# Patient Record
Sex: MALE | Race: NATIVE HAWAIIAN OR OTHER PACIFIC ISLANDER | URBAN - METROPOLITAN AREA
[De-identification: names, ages, dates, MRNs, and addresses within clinical notes are randomized per-mention and may not be internally consistent; named-entity substitution may affect disease eponyms.]

---

## 2017-10-02 ENCOUNTER — THERAPY VISIT (OUTPATIENT)
Dept: PHYSICAL THERAPY | Facility: CLINIC | Age: 77
End: 2017-10-02
Payer: COMMERCIAL

## 2017-10-02 DIAGNOSIS — M79.605 PAIN OF LEFT LOWER EXTREMITY: Primary | ICD-10-CM

## 2017-10-02 PROCEDURE — 97110 THERAPEUTIC EXERCISES: CPT | Mod: GP | Performed by: PHYSICAL THERAPIST

## 2017-10-02 PROCEDURE — 97161 PT EVAL LOW COMPLEX 20 MIN: CPT | Mod: GP | Performed by: PHYSICAL THERAPIST

## 2017-10-02 NOTE — PROGRESS NOTES
[facility] Initial Evaluation      Subjective:    Patient is a 77 year old male presenting with rehab left knee hpi. The history is provided by the patient. The history is limited by a language barrier.   Affected Side: L thigh/ leg and R leg/ ankle.  Condition occurred with:  Insidious onset.  Condition occurred: for unknown reasons.  This is a chronic condition  MD referral 9/25/2017.  Reports R knee surgery 7/11/2017.  History of L knee surgery also..      Radiates to:  Thigh, knee, lower leg and hip (L thigh/ ITB;  also has pain in R foot).  Pain is described as aching and sharp and is constant and reported as 7/10.  Associated symptoms:  Loss of strength and loss of motion/stiffness.   Symptoms are exacerbated by walking, standing, bending/squatting and sitting and relieved by nothing.  Since onset symptoms are unchanged.        General health as reported by patient is good.    Medical allergies: no.  Other surgeries include:  Orthopedic surgery (knees).  Current medications:  None as reported by the patient.  Current occupation is none.        Barriers include:  None as reported by the patient.    Red flags:  None as reported by the patient.                        Objective:      Gait:  Uses cane on R.  Short stride on L.    Gait Type:  Antalgic                                                           Knee Evaluation:  ROM:  AROM: normal            Strength:     Extension:  Left: 5/5   Pain:      Right: 5-/5   Pain:  Flexion:  Left: 5/5   Pain:      Right: 5-/5   Pain:    Quad Set Left: Good    Pain:   Quad Set Right: Good    Pain:  Ligament Testing:  Normal                Special Tests:   Left knee positive for the following special tests:  IT Band Friction    Palpation:    Left knee tenderness present at:  IT Band and Gluteus Medius    Edema:  Edema of the knee: mild swelling R ankle.            General     ROS    Assessment/Plan:      Patient is a 77 year old male with bilat leg  complaints.    Patient has the following significant findings with corresponding treatment plan.                Diagnosis 1:  bilat leg pains  Pain -  manual therapy, self management, education, directional preference exercise and home program  Decreased ROM/flexibility - manual therapy and therapeutic exercise  Decreased strength - therapeutic exercise and therapeutic activities  Decreased function - therapeutic activities    Therapy Evaluation Codes:   1) History comprised of:   Personal factors that impact the plan of care:      Language.    Comorbidity factors that impact the plan of care are:      None.     Medications impacting care: None.  2) Examination of Body Systems comprised of:   Body structures and functions that impact the plan of care:      bilat leg pains.   Activity limitations that impact the plan of care are:      Standing and Walking.  3) Clinical presentation characteristics are:   Stable/Uncomplicated.  4) Decision-Making    Low complexity using standardized patient assessment instrument and/or measureable assessment of functional outcome.  Cumulative Therapy Evaluation is: Low complexity.    Previous and current functional limitations:  (See Goal Flow Sheet for this information)    Short term and Long term goals: (See Goal Flow Sheet for this information)     Communication ability:  Slight language barier.  Treatment Explanation - The following has been discussed with the patient:   RX ordered/plan of care  Anticipated outcomes  Possible risks and side effects  This patient would benefit from PT intervention to resume normal activities.   Rehab potential is good.    Frequency:  1 X week, once daily  Duration:  for 6 weeks  Discharge Plan:  Achieve all LTG.  Independent in home treatment program.  Reach maximal therapeutic benefit.    Please refer to the daily flowsheet for treatment today, total treatment time and time spent performing 1:1 timed codes.

## 2017-10-02 NOTE — LETTER
[facility]  [address]  [facility] [address]  [phone]    2017  Re: Juan José Sauceda   :   1940  MRN:  [id number]   REFERRING PHYSICIAN:   Betina Hooks  [facility]  Date of Initial Evaluation: 10/2/2017  Visits: 1 Rxs Used: 1  Reason for Referral:  Pain of left lower extremity  EVALUATION SUMMARY  [facility] Initial Evaluation  Subjective:  Patient is a 77 year old male presenting with rehab left knee hpi. The history is provided by the patient. The history is limited by a language barrier.   Affected Side: L thigh/ leg and R leg/ ankle.  Condition occurred with:  Insidious onset.  Condition occurred: for unknown reasons.  This is a chronic condition  MD referral 2017.  Reports R knee surgery 2017.  History of L knee surgery also..      Radiates to:  Thigh, knee, lower leg and hip (L thigh/ ITB;  also has pain in R foot).  Pain is described as aching and sharp and is constant and reported as 7/10.  Associated symptoms:  Loss of strength and loss of motion/stiffness.   Symptoms are exacerbated by walking, standing, bending/squatting and sitting and relieved by nothing.  Since onset symptoms are unchanged.        General health as reported by patient is good.    Medical allergies: no.  Other surgeries include:  Orthopedic surgery (knees).  Current medications:  None as reported by the patient.  Current occupation is none.      Barriers include:  None as reported by the patient.  Red flags:  None as reported by the patient.  Objective:  Gait:  Uses cane on R.  Short stride on L.    Gait Type:  Antalgic     Knee Evaluation:  ROM:  AROM: normal    Strength:   Extension:  Left: 5/5   Pain:      Right: 5-/5   Pain:  Flexion:  Left: 5/5   Pain:      Right: 5-/5   Pain:    Quad Set Left: Good    Pain:   Quad Set Right: Good    Pain:  Ligament Testing:  Normal  Special  Tests:   Left knee positive for the following special tests:  IT Band Friction  Palpation:    Left knee tenderness present at:  IT Band and Gluteus Medius  Edema:  Edema of the knee: mild swelling R ankle.  General   ROS  Assessment/Plan:    Patient is a 77 year old male with bilat leg complaints.    Patient has the following significant findings with corresponding treatment plan.                Diagnosis 1:  bilat leg pains  Pain -  manual therapy, self management, education, directional preference exercise and home program  Re: Juan José Sauceda   :   1940  Decreased ROM/flexibility - manual therapy and therapeutic exercise  Decreased strength - therapeutic exercise and therapeutic activities  Decreased function - therapeutic activities  Therapy Evaluation Codes:   1) History comprised of:   Personal factors that impact the plan of care:      Language.    Comorbidity factors that impact the plan of care are:      None.     Medications impacting care: None.  2) Examination of Body Systems comprised of:   Body structures and functions that impact the plan of care:      bilat leg pains.   Activity limitations that impact the plan of care are:      Standing and Walking.  3) Clinical presentation characteristics are:   Stable/Uncomplicated.  4) Decision-Making    Low complexity using standardized patient assessment instrument and/or measureable assessment of functional outcome.  Cumulative Therapy Evaluation is: Low complexity.  Previous and current functional limitations:  (See Goal Flow Sheet for this information)    Short term and Long term goals: (See Goal Flow Sheet for this information)   Communication ability:  Slight language barier.  Treatment Explanation - The following has been discussed with the patient:   RX ordered/plan of care  Anticipated outcomes  Possible risks and side effects  This patient would benefit from PT intervention to resume normal activities.   Rehab potential is good.  Frequency:  1  X week, once daily  Duration:  for 6 weeks  Discharge Plan:  Achieve all LTG.  Independent in home treatment program.  Reach maximal therapeutic benefit.  Thank you for your referral.    INQUIRIES  Therapist: Alphonso Davies PT   [facility]  [address] [place] [id number]  Phone: [phone]  Fax: [phone]

## 2017-10-02 NOTE — MR AVS SNAPSHOT
"              After Visit Summary   10/2/2017    Juan José Sauceda    MRN: [id number]           Patient Information     Date Of Birth          1940        Visit Information        Provider Department      10/2/2017 11:30 AM Alphonso Davies, PT [facility]        Today's Diagnoses     Pain of left lower extremity    -  1       Follow-ups after your visit        Your next 10 appointments already scheduled     Oct 11, 2017 11:00 AM CDT   LUIZ Extremity with Alphonso Davies PT   [facility] ([facility])    [address]  [facility] [address]   [phone]              Who to contact     If you have questions or need follow up information about today's clinic visit or your schedule please contact [facility]LET[facility] PT directly at [phone].  Normal or non-critical lab and imaging results will be communicated to you by MyChart, letter or phone within 4 business days after the clinic has received the results. If you do not hear from us within 7 days, please contact the clinic through MyChart or phone. If you have a critical or abnormal lab result, we will notify you by phone as soon as possible.  Submit refill requests through [employer] or call your pharmacy and they will forward the refill request to us. Please allow 3 business days for your refill to be completed.          Additional Information About Your Visit        MyChart Information     [employer] lets you send messages to your doctor, view your test results, renew your prescriptions, schedule appointments and more. To sign up, go to [email or web address] . Click on \"Log in\" on the left side of the screen, which will take you to the Welcome page. Then click on \"Sign up Now\" on the right side of the page.     You will be asked to enter the access code listed below, as well as some personal information. Please follow the " directions to create your username and password.     Your access code is: [email or web address]  Expires: 2017  2:11 PM     Your access code will  in 90 days. If you need help or a new code, please call your [place] clinic or [phone].        Care EveryWhere ID     This is your Care EveryWhere ID. This could be used by other organizations to access your [place] medical records  [id number]         Blood Pressure from Last 3 Encounters:   No data found for BP    Weight from Last 3 Encounters:   No data found for Wt              We Performed the Following     HC PT EVAL, LOW COMPLEXITY     LUIZ INITIAL EVAL REPORT     THERAPEUTIC EXERCISES        Primary Care Provider    None Specified       No primary provider on file.        Equal Access to Services     [facility] : Quincy Aggarwal, cathryn fatima, sydni perry, milagros jaimes . So [facility] [phone].    ATENCIÓN: Si habla español, tiene a rojas disposición servicios gratuitos de asistencia lingüística. Llame al [phone].    We comply with applicable federal civil rights laws and [place] laws. We do not discriminate on the basis of race, color, national origin, age, disability, sex, sexual orientation, or gender identity.            Thank you!     Thank you for choosing [facility]  for your care. Our goal is always to provide you with excellent care. Hearing back from our patients is one way we can continue to improve our services. Please take a few minutes to complete the written survey that you may receive in the mail after your visit with us. Thank you!             Your Updated Medication List - Protect others around you: Learn how to safely use, store and throw away your medicines at www.disposemymeds.org.          This list is accurate as of: 10/2/17  2:11 PM.  Always use your most recent med list.                   Brand Name Dispense Instructions for use  Diagnosis    ADVAIR DISKUS 500-50 MCG/DOSE diskus inhaler   Generic drug:  fluticasone-salmeterol      Inhale 1 puff into the lungs every 12 hours.        albuterol 90 MCG/ACT inhaler      Inhale 2 puffs into the lungs every 6 hours as needed.        amiodarone 200 MG tablet    PACERONE/CODARONE     Take 200 mg by mouth daily.        ASMANEX 120 METERED DOSES 220 MCG/INH Inhaler   Generic drug:  mometasone      Inhale 2 puffs into the lungs daily.        ergocalciferol 35432 UNITS capsule    ERGOCALCIFEROL     Take 50,000 Units by mouth once a week.        ertapenem 1 GM injection    INVanz    85 mL    Inject 1 g into the vein every 24 hours.        GLUCOTROL 10 MG tablet   Generic drug:  glipiZIDE      Take 10 mg by mouth 2 times daily (before meals).        LANTUS VIAL 100 UNIT/ML injection   Generic drug:  insulin glargine      Inject  Subcutaneous daily. 5 units before lunch        LIPITOR 40 MG tablet   Generic drug:  atorvastatin      Take 40 mg by mouth daily.        NovoLIN MIX 70/30 VIAL injection   Generic drug:  insulin NPH-insulin regular      Inject  Subcutaneous See Admin Instructions. Sliding scale        predniSONE 1 MG tablet    DELTASONE     Take  by mouth daily. 2 tablets once daily with a meal        SPIRIVA HANDIHALER 18 MCG capsule   Generic drug:  tiotropium      Inhale 18 mcg into the lungs daily.        warfarin 5 MG tablet    COUMADIN     Take  by mouth daily. Sliding scale

## 2017-10-18 ENCOUNTER — THERAPY VISIT (OUTPATIENT)
Dept: PHYSICAL THERAPY | Facility: CLINIC | Age: 77
End: 2017-10-18
Payer: COMMERCIAL

## 2017-10-18 DIAGNOSIS — M79.605 PAIN OF LEFT LOWER EXTREMITY: ICD-10-CM

## 2017-10-18 PROCEDURE — 97110 THERAPEUTIC EXERCISES: CPT | Mod: GP | Performed by: PHYSICAL THERAPIST

## 2017-10-18 PROCEDURE — 97112 NEUROMUSCULAR REEDUCATION: CPT | Mod: GP | Performed by: PHYSICAL THERAPIST

## 2017-10-18 NOTE — MR AVS SNAPSHOT
"              After Visit Summary   10/18/2017    Juan José Sauceda    MRN: [id number]           Patient Information     Date Of Birth          1940        Visit Information        Provider Department      10/18/2017 11:20 AM Oxana Cho, PT [facility] PT        Today's Diagnoses     Pain of left lower extremity           Follow-ups after your visit        Your next 10 appointments already scheduled     Oct 30, 2017 11:30 AM CDT   LUIZ Extremity with Oxana Cho PT   [facility] ([facility])    [address] [facility] [address] [phone]            Nov 06, 2017 11:30 AM CST   LUIZ Extremity with Oxana Cho PT   [facility] ([facility])    [address] [facility] [id number] [phone]              Who to contact     If you have questions or need follow up information about today's clinic visit or your schedule please contact [facility] PT directly at [phone].  Normal or non-critical lab and imaging results will be communicated to you by [employer], letter or phone within 4 business days after the clinic has received the results. If you do not hear from us within 7 days, please contact the clinic through [employer] or phone. If you have a critical or abnormal lab result, we will notify you by phone as soon as possible.  Submit refill requests through [employer] or call your pharmacy and they will forward the refill request to us. Please allow 3 business days for your refill to be completed.          Additional Information About Your Visit        Tiq[employer]hart Information     [employer] lets you send messages to your doctor, view your test results, renew your prescriptions, schedule appointments and more. To sign up, go to [email or web address] . Click on \"Log in\" on the left side of the screen, which will take " "you to the Welcome page. Then click on \"Sign up Now\" on the right side of the page.     You will be asked to enter the access code listed below, as well as some personal information. Please follow the directions to create your username and password.     Your access code is: [email or web address]  Expires: 2017  2:11 PM     Your access code will  in 90 days. If you need help or a new code, please call your [place] clinic or [phone].        Care EveryWhere ID     This is your Care EveryWhere ID. This could be used by other organizations to access your [place] medical records  [id number]         Blood Pressure from Last 3 Encounters:   No data found for BP    Weight from Last 3 Encounters:   No data found for Wt              We Performed the Following     NEUROMUSCULAR RE-EDUCATION     THERAPEUTIC EXERCISES        Primary Care Provider    None Specified       No primary provider on file.        Equal Access to Services     [facility]: Hadii shan Aggarwal, cathryn fatima, sydni perry, milagros jaimes . So [facility] [phone].    ATENCIÓN: Si habla español, tiene a rojas disposición servicios gratuitos de asistencia lingüística. Llame al [phone].    We comply with applicable federal civil rights laws and [place] laws. We do not discriminate on the basis of race, color, national origin, age, disability, sex, sexual orientation, or gender identity.            Thank you!     Thank you for choosing [facility]  for your care. Our goal is always to provide you with excellent care. Hearing back from our patients is one way we can continue to improve our services. Please take a few minutes to complete the written survey that you may receive in the mail after your visit with us. Thank you!             Your Updated Medication List - Protect others around you: Learn how to safely use, store and throw away your medicines at " www.disposemymeds.org.          This list is accurate as of: 10/18/17 12:54 PM.  Always use your most recent med list.                   Brand Name Dispense Instructions for use Diagnosis    ADVAIR DISKUS 500-50 MCG/DOSE diskus inhaler   Generic drug:  fluticasone-salmeterol      Inhale 1 puff into the lungs every 12 hours.        albuterol 90 MCG/ACT inhaler      Inhale 2 puffs into the lungs every 6 hours as needed.        amiodarone 200 MG tablet    PACERONE/CODARONE     Take 200 mg by mouth daily.        ASMANEX 120 METERED DOSES 220 MCG/INH Inhaler   Generic drug:  mometasone      Inhale 2 puffs into the lungs daily.        ergocalciferol 91627 UNITS capsule    ERGOCALCIFEROL     Take 50,000 Units by mouth once a week.        ertapenem 1 GM injection    INVanz    85 mL    Inject 1 g into the vein every 24 hours.        GLUCOTROL 10 MG tablet   Generic drug:  glipiZIDE      Take 10 mg by mouth 2 times daily (before meals).        LANTUS VIAL 100 UNIT/ML injection   Generic drug:  insulin glargine      Inject  Subcutaneous daily. 5 units before lunch        LIPITOR 40 MG tablet   Generic drug:  atorvastatin      Take 40 mg by mouth daily.        NovoLIN MIX 70/30 VIAL injection   Generic drug:  insulin NPH-insulin regular      Inject  Subcutaneous See Admin Instructions. Sliding scale        predniSONE 1 MG tablet    DELTASONE     Take  by mouth daily. 2 tablets once daily with a meal        SPIRIVA HANDIHALER 18 MCG capsule   Generic drug:  tiotropium      Inhale 18 mcg into the lungs daily.        warfarin 5 MG tablet    COUMADIN     Take  by mouth daily. Sliding scale

## 2018-04-19 PROBLEM — M79.605 PAIN OF LEFT LOWER EXTREMITY: Status: RESOLVED | Noted: 2017-10-02 | Resolved: 2018-04-19

## 2018-04-19 NOTE — PROGRESS NOTES
Subjective:  HPI                    Objective:  System    Physical Exam    General     ROS    Assessment/Plan:    DISCHARGE REPORT    Progress reporting period is from 10/2/17 to 10/18/17.     SUBJECTIVE  Subjective: No change.    Current Pain level: 7/10   Initial Pain level: 7/10   Changes in function: No changes noted in function since last SOAP note   Adverse reactions: None;   ,     Patient has failed to return to therapy so current objective findings are unknown.  The subjective and objective information are from the last SOAP note on this patient.    OBJECTIVE  Objective: Requires many cues and increased time for exercises. Difficult to communicate d/t language barrier.      ASSESSMENT/PLAN  Diagnosis 1:  bilat leg pains  Pain -  manual therapy, self management, education, directional preference exercise and home program  Decreased ROM/flexibility - manual therapy and therapeutic exercise  Decreased strength - therapeutic exercise and therapeutic activities  Decreased function - therapeutic activities  STG/LTGs have been met or progress has been made towards goals:  Yes (See Goal flow sheet completed today.)  Assessment of Progress: The patient has not returned to therapy. Current status is unknown.  Self Management Plans:  Patient has been instructed in a home treatment program.  Patient  has been instructed in self management of symptoms.  Juan José continues to require the following intervention to meet STG and LTG's: PT intervention is no longer required to meet STG/LTG.  The patient failed to complete scheduled/ordered appointments so current information is unknown.  We will discharge this patient from PT.    Recommendations:  This patient is ready to be discharged from therapy and continue their home treatment program.    Please refer to the daily flowsheet for treatment today, total treatment time and time spent performing 1:1 timed codes.